# Patient Record
Sex: MALE | Race: WHITE
[De-identification: names, ages, dates, MRNs, and addresses within clinical notes are randomized per-mention and may not be internally consistent; named-entity substitution may affect disease eponyms.]

---

## 2017-09-14 ENCOUNTER — HOSPITAL ENCOUNTER (OUTPATIENT)
Dept: HOSPITAL 33 - SDC | Age: 67
Discharge: HOME | End: 2017-09-14
Attending: FAMILY MEDICINE
Payer: COMMERCIAL

## 2017-09-14 VITALS — SYSTOLIC BLOOD PRESSURE: 102 MMHG | HEART RATE: 67 BPM | DIASTOLIC BLOOD PRESSURE: 58 MMHG

## 2017-09-14 VITALS — OXYGEN SATURATION: 99 %

## 2017-09-14 DIAGNOSIS — Z12.11: ICD-10-CM

## 2017-09-14 DIAGNOSIS — K21.9: Primary | ICD-10-CM

## 2017-09-14 DIAGNOSIS — R63.4: ICD-10-CM

## 2017-09-14 PROCEDURE — 0DJ08ZZ INSPECTION OF UPPER INTESTINAL TRACT, VIA NATURAL OR ARTIFICIAL OPENING ENDOSCOPIC: ICD-10-PCS

## 2017-09-14 PROCEDURE — 00810: CPT

## 2017-09-14 PROCEDURE — 00740: CPT

## 2017-09-14 PROCEDURE — 0DJD8ZZ INSPECTION OF LOWER INTESTINAL TRACT, VIA NATURAL OR ARTIFICIAL OPENING ENDOSCOPIC: ICD-10-PCS

## 2017-09-14 NOTE — OP
SURGERY DATE/TIME:     09/14/2017  0703



PREOPERATIVE DIAGNOSES:   

1) Persistent gastroesophageal reflux disease. 

2) Screening colonoscopy.

3) Unexplained weight loss.



POSTOPERATIVE DIAGNOSES:    

1) Normal EGD.

2) Normal colon. 



PROCEDURES:    

1) EGD. 

2) Colonoscopy. 



SURGEON: Arjun Salgado M.D.



ANESTHESIA:  MAC by Aidan Nagy CRNA. 



ESTIMATED BLOOD LOSS:  None. 



SPECIMENS:  None.          



DESCRIPTION OF PROCEDURE: After informed written consent was obtained, the patient was 
taken to the endoscopy suite. He underwent monitored anesthesia after a bite block was 
inserted. The endoscope was inserted in the posterior oropharynx and under direct 
visualization the esophagus was traversed. The esophageal mucosa was normal in appearance 
free of lesions or defects. The gastric mucosa had no obvious abnormalities upon entering 
the gastric cavity. The pylorus was traversed and the first and second portions of the 
duodenum was within normal limits. Upon withdrawal all mucosal structures appeared normal. 
The scope was removed and the scopes were switched.   

 

Digital rectal exam showed normal sphincter tone and no internal lesions. The scope was 
inserted in the rectum and sequentially the entire colonic mucosa was traversed. The level 
of cecum was reached and verified with direct visualization of ileocecal valve. Upon 
withdrawal careful mucosal inspection revealed no gross abnormalities. Prep was noted to 
be fair. There was some liquid stool throughout the colon. Prior to withdrawal 
retroflexion was performed and showed no internal lesions. The scope was removed and the 
patient was transferred to the recovery room in excellent condition.

## 2022-06-16 ENCOUNTER — HOSPITAL ENCOUNTER (OUTPATIENT)
Dept: HOSPITAL 33 - ED | Age: 72
Setting detail: OBSERVATION
LOS: 2 days | Discharge: HOME | End: 2022-06-18
Attending: FAMILY MEDICINE | Admitting: FAMILY MEDICINE
Payer: MEDICARE

## 2022-06-16 DIAGNOSIS — Z20.828: ICD-10-CM

## 2022-06-16 DIAGNOSIS — Z79.899: ICD-10-CM

## 2022-06-16 DIAGNOSIS — J18.9: Primary | ICD-10-CM

## 2022-06-16 DIAGNOSIS — Z72.0: ICD-10-CM

## 2022-06-16 LAB
ALBUMIN SERPL-MCNC: 4 G/DL (ref 3.5–5)
ALP SERPL-CCNC: 66 U/L (ref 38–126)
ALT SERPL-CCNC: 12 U/L (ref 0–50)
AMPHETAMINES UR QL: NEGATIVE
ANION GAP SERPL CALC-SCNC: 13.3 MEQ/L (ref 5–15)
AST SERPL QL: 22 U/L (ref 17–59)
BARBITURATES UR QL: NEGATIVE
BASOPHILS # BLD AUTO: 0.04 X10^3/UL (ref 0–0.4)
BENZODIAZ UR QL SCN: NEGATIVE
BILIRUB BLD-MCNC: 0.7 MG/DL (ref 0.2–1.3)
BLD SMEAR INTERP: YES
BUN SERPL-MCNC: 21 MG/DL (ref 9–20)
CALCIUM SPEC-MCNC: 9.2 MG/DL (ref 8.4–10.2)
CHLORIDE SERPL-SCNC: 95 MMOL/L (ref 98–107)
CO2 SERPL-SCNC: 28 MMOL/L (ref 22–30)
COCAINE UR QL SCN: NEGATIVE
CREAT SERPL-MCNC: 0.96 MG/DL (ref 0.66–1.25)
EOSINOPHIL # BLD AUTO: 0.08 X10^3/UL (ref 0–0.5)
ETHANOL SERPL-MCNC: < 10 MG/DL (ref 0–10)
FLUAV AG NPH QL IA: NEGATIVE
FLUBV AG NPH QL IA: NEGATIVE
GFR SERPLBLD BASED ON 1.73 SQ M-ARVRAT: > 60 ML/MIN
GLUCOSE SERPL-MCNC: 108 MG/DL (ref 74–106)
GLUCOSE UR-MCNC: NEGATIVE MG/DL
HCT VFR BLD AUTO: 39 % (ref 42–50)
HGB BLD-MCNC: 13.3 G/DL (ref 12.5–18)
LYMPHOCYTES # SPEC AUTO: 1 X10^3/UL (ref 1–4.6)
MCH RBC QN AUTO: 30.2 PG (ref 26–32)
MCHC RBC AUTO-ENTMCNC: 34.1 G/DL (ref 32–36)
METHADONE UR QL: NEGATIVE
MONOCYTES # BLD AUTO: 1.52 X10^3/UL (ref 0–1.3)
OPIATES UR QL: NEGATIVE
PCP UR QL CFM>20 NG/ML: NEGATIVE
PLATELET # BLD AUTO: 200 X10^3/UL (ref 150–450)
POTASSIUM SERPLBLD-SCNC: 3.9 MMOL/L (ref 3.5–5.1)
PROT SERPL-MCNC: 6.6 G/DL (ref 6.3–8.2)
PROT UR STRIP-MCNC: NEGATIVE MG/DL
RBC # BLD AUTO: 4.41 X10^6/UL (ref 4.1–5.6)
RBC # UR AUTO: (no result) ERY/UL (ref 0–5)
RBC #/AREA URNS HPF: (no result) /HPF (ref 0–2)
RSV AG SPEC QL IA: NEGATIVE
SARS-COV-2 AG RESP QL IA.RAPID: NEGATIVE
SODIUM SERPL-SCNC: 133 MMOL/L (ref 137–145)
THC UR QL SCN: NEGATIVE
UA DIPSTICK PNL UR: (no result)
URINE CULTURED INDICATED?: NO
WBC # BLD AUTO: 12.8 X10^3/UL (ref 4–10.5)
WBC #/AREA URNS HPF: (no result) /HPF (ref 0–5)

## 2022-06-16 PROCEDURE — 71250 CT THORAX DX C-: CPT

## 2022-06-16 PROCEDURE — 99285 EMERGENCY DEPT VISIT HI MDM: CPT

## 2022-06-16 PROCEDURE — 94760 N-INVAS EAR/PLS OXIMETRY 1: CPT

## 2022-06-16 PROCEDURE — 96374 THER/PROPH/DIAG INJ IV PUSH: CPT

## 2022-06-16 PROCEDURE — G0378 HOSPITAL OBSERVATION PER HR: HCPCS

## 2022-06-16 PROCEDURE — 83605 ASSAY OF LACTIC ACID: CPT

## 2022-06-16 PROCEDURE — 80053 COMPREHEN METABOLIC PANEL: CPT

## 2022-06-16 PROCEDURE — 70450 CT HEAD/BRAIN W/O DYE: CPT

## 2022-06-16 PROCEDURE — 84443 ASSAY THYROID STIM HORMONE: CPT

## 2022-06-16 PROCEDURE — 36415 COLL VENOUS BLD VENIPUNCTURE: CPT

## 2022-06-16 PROCEDURE — 81015 MICROSCOPIC EXAM OF URINE: CPT

## 2022-06-16 PROCEDURE — 84484 ASSAY OF TROPONIN QUANT: CPT

## 2022-06-16 PROCEDURE — G0480 DRUG TEST DEF 1-7 CLASSES: HCPCS

## 2022-06-16 PROCEDURE — 82607 VITAMIN B-12: CPT

## 2022-06-16 PROCEDURE — 93005 ELECTROCARDIOGRAM TRACING: CPT

## 2022-06-16 PROCEDURE — 36000 PLACE NEEDLE IN VEIN: CPT

## 2022-06-16 PROCEDURE — 80048 BASIC METABOLIC PNL TOTAL CA: CPT

## 2022-06-16 PROCEDURE — 85025 COMPLETE CBC W/AUTO DIFF WBC: CPT

## 2022-06-16 PROCEDURE — 0241U: CPT

## 2022-06-16 PROCEDURE — 80307 DRUG TEST PRSMV CHEM ANLYZR: CPT

## 2022-06-16 PROCEDURE — 87040 BLOOD CULTURE FOR BACTERIA: CPT

## 2022-06-16 RX ADMIN — THERA TABS SCH TAB: TAB at 22:36

## 2022-06-16 RX ADMIN — TIMOLOL MALEATE SCH ML: 5 SOLUTION OPHTHALMIC at 22:36

## 2022-06-16 RX ADMIN — SIMVASTATIN SCH MG: 20 TABLET, FILM COATED ORAL at 22:36

## 2022-06-16 RX ADMIN — ASPIRIN SCH MG: 81 TABLET, COATED ORAL at 22:36

## 2022-06-16 RX ADMIN — CHLORTHALIDONE SCH MG: 25 TABLET ORAL at 23:15

## 2022-06-16 RX ADMIN — OXYBUTYNIN CHLORIDE SCH MG: 5 TABLET, EXTENDED RELEASE ORAL at 22:36

## 2022-06-16 RX ADMIN — AZITHROMYCIN DIHYDRATE SCH MLS/HR: 500 INJECTION, POWDER, LYOPHILIZED, FOR SOLUTION INTRAVENOUS at 22:35

## 2022-06-16 RX ADMIN — MELOXICAM SCH MG: 7.5 TABLET ORAL at 22:36

## 2022-06-16 NOTE — ERPHSYRPT
- History of Present Illness


Source: patient, other (Wife)


Exam Limitations: no limitations


Patient Subjective Stated Complaint: pt here for generally not feeling since 

tuesday, aches all over, falling asleep often at home, sob at times


Triage Nursing Assessment: pt alert, walked in, able to undress, resp easy, face

mask in place, o2 sat 88% ra, o2 at 2 lnc applied


Physician History: 





72 yo wm w lethargy x 1 week. Pt/wife deny focal weakness. Pt has a worsening 

cough/mild coryza. N/V/D/melena/hematochezia/fever/dysuria/hematuria/chest pain 

all denied.Pt w sats high 80's upon arrival which increased w 2L O2 NC.


Timing/Duration: other (1week)


Associated Symptoms: shortness of breath, cough, chills, loss of appetite, 

malaise, weakness, No nausea, No vomiting, No abdominal pain, No heartburn, No 

diaphoresis, No chest pain, No fever, No headaches, No rash, No syncope, No 

seizure


Allergies/Adverse Reactions: 








No Known Drug Allergies Allergy (Verified 06/16/22 20:26)


   





Home Medications: 








Aspirin 81 gm Chew*** [Baby Aspirin 81 mg Chew***] 81 mg PO QHS 10/22/14 

[History]


Meloxicam 7.5 tab PO QHS 09/11/17 [History]


Multivitamin [Multivitamins] 1 tab PO QHS 09/11/17 [History]


Oxybutynin Chloride [Oxybutynin Chloride ER] 10 mg PO QHS 09/11/17 [History]


Chlorthalidone 12.5 mg PO QHS 06/16/22 [History]


Latanoprost/Pf [Latanoprost 0.005% Eye Drop] 1 drop OP DAILY 06/16/22 [History]


Pravastatin Sodium 20 mg PO QHS 06/16/22 [History]


Timolol Maleate/Pf [Timolol Maleate 0.5% Eye Drop] 1 drop OP QHS 06/16/22 

[History]





Hx Tetanus, Diphtheria Vaccination/Date Given: No


Hx Influenza Vaccination/Date Given: No


Hx Pneumococcal Vaccination/Date Given: No


Immunizations Up to Date: Yes





Travel Risk





- International Travel


Have you traveled outside of the country in past 3 weeks: No





- Coronavirus Screening


Are you exhibiting any of the following symptoms?: Yes


Symptoms: Shortness of Breath, Headaches/Body Aches/Fatigue


Close contact with a COVID-19 positive Pt in past 14-21 Days: No





- Vaccine Status


Have you recieved a Covid-19 vaccination: No





- Review of Systems


Constitutional: No Symptoms, Fatigue, Lethargy, Malaise


Eyes: No Symptoms


Ears, Nose, & Throat: No Symptoms


Respiratory: No Symptoms, Cough


Cardiac: No Symptoms


Abdominal/Gastrointestinal: No Symptoms


Genitourinary Symptoms: No Symptoms


Musculoskeletal: No Symptoms


Skin: No Symptoms


Neurological: No Symptoms, Lethargy, No Dizziness, No Focal Weakness, No Gait 

Changes, No Headache, No Irritability, No Paralysis, No Parasthesia, No Seizure,

 No Sensory Changes, No Speech Changes, No Tics, No Tremors, No Vertigo


Psychological: No Symptoms


Endocrine: No Symptoms


Hematologic/Lymphatic: No Symptoms


Immunological/Allergic: No Symptoms





- Past Medical History


Pertinent Past Medical History: Yes


Neurological History: TIA


ENT History: No Pertinent History


Cardiac History: No Pertinent History


Respiratory History: No Pertinent History


Endocrine Medical History: No Pertinent History


Musculoskeletal History: No Pertinent History


GI Medical History: No Pertinent History


 History: Other


Psycho-Social History: No Pertinent History


Male Reproductive Disorders: No Pertinent History


Other Medical History: 3 back surgeries, 2 TIA





- Past Surgical History


Past Surgical History: Yes


Neuro Surgical History: No Pertinent History


Cardiac: No Pertinent History


Respiratory: No Pertinent History


Gastrointestinal: Appendectomy, Hernia Repair


Genitourinary: No Pertinent History


Musculoskeletal: Orthopedic Surgery


Male Surgical History: No Pertinent History


Other Surgical History: Back surgery x3





- Social History


Smoking Status: Current every day smoker


How long have you smoked: 50


Exposure to second hand smoke: Yes


Drug Use: none


Patient Lives Alone: No


Significant Family History: no pertinent family hx





- Nursing Vital Signs


Nursing Vital Signs: 


                               Initial Vital Signs











Temperature  99.2 F   06/16/22 17:19


 


Pulse Rate  84   06/16/22 17:19


 


Respiratory Rate  28 H  06/16/22 17:19


 


Blood Pressure  139/70   06/16/22 17:19


 


O2 Sat by Pulse Oximetry  87 L  06/16/22 17:19








                                   Pain Scale











Pain Intensity                 0











Borderline hypertension





- Physical Exam


General Appearance: no apparent distress, lethargy


Eye Exam: PERRL/EOMI, eyes nml inspection


Ears, Nose, Throat Exam: normal ENT inspection, TMs normal, pharynx normal, 

moist mucous membranes


Neck Exam: normal inspection, non-tender, supple, full range of motion, No 

meningismus, No mass, No Brudzinski, No Kernig's


Respiratory Exam: airway intact, diminished breath sounds (Decreased BS B)


Cardiovascular Exam: regular rate/rhythm, normal heart sounds, normal peripheral

 pulses, capillary refill <2 sec, No murmur


Gastrointestinal/Abdomen Exam: soft, normal bowel sounds, No tenderness


Back Exam: normal inspection, normal range of motion, No CVA tenderness, No 

vertebral tenderness


Extremity Exam: normal inspection, normal range of motion


Neurologic Exam: alert, oriented x 3, cooperative, CNs II-XII nml as tested, 

normal mood/affect, nml cerebellar function, nml station & gait, sensation nml


Skin Exam: normal color, warm, dry


Lymphatic Exam: No adenopathy


**SpO2 Interpretation**: hypoxic


SpO2: 87


O2 Delivery: Room Air





- Course


Nursing assessment & vital signs reviewed: Yes


EKG Interpreted by Me: RATE (NSR/Rate 82/RBBB/Nonspecific St-Twave changes)





- CT Exams


  ** Head


CT Interpretation: Discussed w/radiologist (NAD)





  ** Chest


CT Interpretation: Discussed w/radiologist (RLL/Mild LLL pneumonia)


Ordered Tests: 


                               Active Orders 24 hr











 Category Date Time Status


 


 EKG-ER Only STAT Care  06/16/22 17:38 Completed


 


 IV Insertion STAT Care  06/16/22 17:38 Completed


 


 Heart-Healthy  Diet Diet  06/17/22 Breakfast Active


 


 CHEST WITHOUT CONTRAST [CT] Stat Exams  06/16/22 17:39 Taken


 


 HEAD WITHOUT CONTRAST [CT] Stat Exams  06/16/22 17:39 Taken


 


 BLOOD CULTURE Stat Lab  06/16/22 19:44 Stop Req


 


 CBC W DIFF AM.LAB Lab  06/17/22 04:00 Ordered


 


 CBC W DIFF Stat Lab  06/16/22 17:55 Completed


 


 CMP AM.LAB Lab  06/17/22 04:00 Ordered


 


 CMP Stat Lab  06/16/22 17:55 Completed


 


 ETHYL ALCOHOL Stat Lab  06/16/22 17:55 Completed


 


 Lactic Acid AM.LAB Lab  06/17/22 04:00 Ordered


 


 Lactic Acid Stat Lab  06/16/22 17:55 Completed


 


 TROPONIN Q3H Lab  06/16/22 17:55 Completed


 


 TROPONIN Q3H Lab  06/16/22 19:44 Completed


 


 TROPONIN Q3H Lab  06/17/22 02:45 Ordered


 


 TROPONIN Q3H Lab  06/17/22 05:45 Ordered


 


 UA W/RFX CULTURE Stat Lab  06/16/22 20:55 Completed


 


 Urine Triage Profile Stat Lab  06/16/22 20:55 Completed


 


 Transfer Order Routine Transfer  06/16/22 Completed








Medication Summary











Generic Name Dose Route Start Last Admin





  Trade Name Freq  PRN Reason Stop Dose Admin


 


Albuterol/Ipratropium  3 ml  06/16/22 19:33 





  Ipratropium/Albuterol Sulfate 3 Ml Ampul.Neb  IH  07/16/22 19:32 





  Q4HPRN PRN  





  SHORTNESS OF BREATH/WHEEZING  


 


Aspirin  81 mg  06/16/22 22:30  06/16/22 22:36





  Aspirin 81 Mg Tablet.Ec  PO  07/16/22 22:29  81 mg





  HS HIRO   Administration


 


Chlorthalidone  12.5 mg  06/16/22 22:30  06/16/22 23:15





  Chlorthalidone 25 Mg Tablet  PO  07/16/22 22:29  12.5 mg





  HS HIRO   Administration


 


Enoxaparin Sodium  40 mg  06/17/22 10:00 





  Enoxaparin Sodium*** 40 Mg/0.4 Ml Syringe  SQ  07/17/22 09:59 





  DAILY HIRO  


 


Sodium Chloride  1,000 mls @ 100 mls/hr  06/16/22 19:45  06/16/22 19:45





  Sodium Chloride 0.9% 1000 Ml  IV  07/16/22 19:44  100 mls/hr





  .Q10H HIRO   Administration


 


Ceftriaxone Sodium/Dextrose  1 g in 50 mls @ 100 mls/hr  06/17/22 22:00 





  Rocephin 1 Gm-D5w 50 Ml Bag**  IV  06/20/22 21:59 





  Q24H22 HIRO  


 


Azithromycin  500 mg in 250 mls @ 250 mls/hr  06/16/22 22:00  06/16/22 22:35





  Zithromax 500 Mg/ 250 Ml Nacl Premix  IV  07/16/22 21:59  250 mls/hr





  Q24H22 HIRO   Administration


 


Meloxicam  7.5 mg  06/16/22 22:30  06/16/22 22:36





  Meloxicam 7.5 Mg Tablet  PO  07/16/22 22:29  7.5 mg





  HS HIRO   Administration


 


Multivitamins Therapeutic  1 tab  06/16/22 22:30  06/16/22 22:36





  Multivitamins,Therapeutic 1 Tab Tab  PO  07/16/22 22:29  1 tab





  HS HIRO   Administration


 


Ondansetron HCl  4 mg  06/16/22 19:33 





  Ondansetron Hcl 4 Mg/2 Ml Vial  IV  07/16/22 19:32 





  Q6H PRN PRN  





  NAUSEA/VOMITING  


 


Oxybutynin Chloride  10 mg  06/16/22 22:30  06/16/22 22:36





  Oxybutynin Chloride Xl 5 Mg Tab  PO  07/16/22 22:29  10 mg





  HS HIRO   Administration


 


Pantoprazole Sodium  40 mg  06/17/22 10:00 





  Pantoprazole 40 Mg Vial  IV  07/17/22 09:59 





  Q24H10 HIRO  


 


Simvastatin  20 mg  06/16/22 22:30  06/16/22 22:36





  Simvastatin 20 Mg Tablet  PO  07/16/22 22:29  20 mg





  HS HIRO   Administration


 


Timolol Maleate  0 ml  06/16/22 22:30  06/16/22 22:36





  Timolol Maleate 0.5% 5 Ml  Eye Drops  OP  07/16/22 22:29  1 ml





  HS HIRO   Administration














Discontinued Medications














Generic Name Dose Route Start Last Admin





  Trade Name Freq  PRN Reason Stop Dose Admin


 


Aspirin  81 mg  06/16/22 22:30 





  Aspirin 81 Mg Tablet.Ec  PO  06/16/22 22:31 





  ONCE ONE  


 


Ceftriaxone Sodium/Dextrose  1 g in 50 mls @ 100 mls/hr  06/16/22 19:22  

06/16/22 19:56





  Rocephin 1 Gm-D5w 50 Ml Bag**  IV  06/16/22 19:51  Infused





  STAT STA   Infusion


 


Ceftriaxone Sodium/Dextrose  Confirm  06/16/22 19:24 





  Rocephin 1 Gm-D5w 50 Ml Bag**  Administered  06/16/22 19:25 





  Dose  





  1 g in 50 mls @ ud  





  IV  





  .STK-MED ONE  


 


Oxybutynin Chloride  10 mg  06/16/22 22:30 





  Oxybutynin Chloride Xl 5 Mg Tab  PO  06/16/22 22:31 





  ONCE ONE  











Lab/Rad Data: 


                           Laboratory Result Diagrams





                                 06/16/22 17:55 





                                 06/16/22 17:55 





                               Laboratory Results











  06/16/22 06/16/22 06/16/22 Range/Units





  19:44 17:55 17:55 


 


WBC     (4.0-10.5)  x10^3/uL


 


RBC     (4.1-5.6)  x10^6/uL


 


Hgb     (12.5-18.0)  g/dL


 


Hct     (42-50)  %


 


MCV     ()  fL


 


MCH     (26-32)  pg


 


MCHC     (32-36)  g/dL


 


RDW     (11.5-14.0)  %


 


Plt Count     (150-450)  x10^3/uL


 


MPV     (7.5-11.0)  fL


 


Gran %     (36.0-66.0)  %


 


Immature Gran % (Auto)     (0.00-0.4)  %


 


Nucleat RBC Rel Count     (0.00-0.1)  %


 


Eos # (Auto)     (0-0.5)  x10^3/uL


 


Immature Gran # (Auto)     (0.00-0.03)  x10^3u/L


 


Absolute Lymphs (auto)     (1.0-4.6)  x10^3/uL


 


Absolute Monos (auto)     (0.0-1.3)  x10^3/uL


 


Absolute Nucleated RBC     (0.00-0.01)  x10^3u/L


 


Lymphocytes %     (24.0-44.0)  %


 


Monocytes %     (0.0-12.0)  %


 


Eosinophils %     (0.00-5.0)  %


 


Basophils %     (0.0-0.4)  %


 


Absolute Granulocytes     (1.4-6.9)  x10^3/uL


 


Basophils #     (0-0.4)  x10^3/uL


 


Sodium     (137-145)  mmol/L


 


Potassium     (3.5-5.1)  mmol/L


 


Chloride     ()  mmol/L


 


Carbon Dioxide     (22-30)  mmol/L


 


Anion Gap     (5-15)  MEQ/L


 


BUN     (9-20)  mg/dL


 


Creatinine     (0.66-1.25)  mg/dL


 


Estimated GFR     ML/MIN


 


Glucose     ()  mg/dL


 


Lactic Acid     (0.4-2.0)  


 


Calcium     (8.4-10.2)  mg/dL


 


Total Bilirubin     (0.2-1.3)  mg/dL


 


AST     (17-59)  U/L


 


ALT     (0-50)  U/L


 


Alkaline Phosphatase     ()  U/L


 


Troponin I  < 0.012   < 0.012  (0.000-0.034)  ng/mL


 


Serum Total Protein     (6.3-8.2)  g/dL


 


Albumin     (3.5-5.0)  g/dL


 


Ethyl Alcohol     (0-10)  mg/dL


 


Influenza Type A Ag   NEGATIVE   (NEGATIVE)  


 


Influenza Type B Ag   NEGATIVE   (NEGATIVE)  


 


RSV (PCR)   NEGATIVE   (Negative)  


 


SARS-CoV-2 (PCR)   NEGATIVE   (NEGATIVE)  


 


Slides for Path Review     














  06/16/22 06/16/22 06/16/22 Range/Units





  17:55 17:55 17:55 


 


WBC    12.8 H  (4.0-10.5)  x10^3/uL


 


RBC    4.41  (4.1-5.6)  x10^6/uL


 


Hgb    13.3  (12.5-18.0)  g/dL


 


Hct    39.0 L  (42-50)  %


 


MCV    88.4  ()  fL


 


MCH    30.2  (26-32)  pg


 


MCHC    34.1  (32-36)  g/dL


 


RDW    13.2  (11.5-14.0)  %


 


Plt Count    200  (150-450)  x10^3/uL


 


MPV    8.5  (7.5-11.0)  fL


 


Gran %    78.9 H  (36.0-66.0)  %


 


Immature Gran % (Auto)    0.5 H  (0.00-0.4)  %


 


Nucleat RBC Rel Count    0.0  (0.00-0.1)  %


 


Eos # (Auto)    0.08  (0-0.5)  x10^3/uL


 


Immature Gran # (Auto)    0.07 H  (0.00-0.03)  x10^3u/L


 


Absolute Lymphs (auto)    1.00  (1.0-4.6)  x10^3/uL


 


Absolute Monos (auto)    1.52 H  (0.0-1.3)  x10^3/uL


 


Absolute Nucleated RBC    0.00  (0.00-0.01)  x10^3u/L


 


Lymphocytes %    7.8 L  (24.0-44.0)  %


 


Monocytes %    11.9  (0.0-12.0)  %


 


Eosinophils %    0.6  (0.00-5.0)  %


 


Basophils %    0.3  (0.0-0.4)  %


 


Absolute Granulocytes    10.08 H  (1.4-6.9)  x10^3/uL


 


Basophils #    0.04  (0-0.4)  x10^3/uL


 


Sodium  133 L    (137-145)  mmol/L


 


Potassium  3.9    (3.5-5.1)  mmol/L


 


Chloride  95 L    ()  mmol/L


 


Carbon Dioxide  28    (22-30)  mmol/L


 


Anion Gap  13.3    (5-15)  MEQ/L


 


BUN  21 H    (9-20)  mg/dL


 


Creatinine  0.96    (0.66-1.25)  mg/dL


 


Estimated GFR  > 60.0    ML/MIN


 


Glucose  108 H    ()  mg/dL


 


Lactic Acid   0.9   (0.4-2.0)  


 


Calcium  9.2    (8.4-10.2)  mg/dL


 


Total Bilirubin  0.70    (0.2-1.3)  mg/dL


 


AST  22    (17-59)  U/L


 


ALT  12    (0-50)  U/L


 


Alkaline Phosphatase  66    ()  U/L


 


Troponin I     (0.000-0.034)  ng/mL


 


Serum Total Protein  6.6    (6.3-8.2)  g/dL


 


Albumin  4.0    (3.5-5.0)  g/dL


 


Ethyl Alcohol  < 10    (0-10)  mg/dL


 


Influenza Type A Ag     (NEGATIVE)  


 


Influenza Type B Ag     (NEGATIVE)  


 


RSV (PCR)     (Negative)  


 


SARS-CoV-2 (PCR)     (NEGATIVE)  


 


Slides for Path Review    YES  














- Progress


Progress: improved


Progress Note: 





06/16/22 19:32


Blood cultures x2


Rocephin 1gm IV


Discussed with Dr.: Malena


Will see patient in: hospital (observation)


Counseled pt/family regarding: lab results, diagnosis, need for follow-up, rad 

results





- Departure


Departure Disposition: Observation


Clinical Impression: 


 Pneumonia





Condition: Stable


Critical Care Time: No

## 2022-06-17 LAB
ALBUMIN SERPL-MCNC: 3.3 G/DL (ref 3.5–5)
ALP SERPL-CCNC: 57 U/L (ref 38–126)
ALT SERPL-CCNC: 12 U/L (ref 0–50)
ANION GAP SERPL CALC-SCNC: 11.5 MEQ/L (ref 5–15)
AST SERPL QL: 21 U/L (ref 17–59)
BASOPHILS # BLD AUTO: 0.03 X10^3/UL (ref 0–0.4)
BILIRUB BLD-MCNC: 0.5 MG/DL (ref 0.2–1.3)
BUN SERPL-MCNC: 21 MG/DL (ref 9–20)
CALCIUM SPEC-MCNC: 8.7 MG/DL (ref 8.4–10.2)
CHLORIDE SERPL-SCNC: 100 MMOL/L (ref 98–107)
CO2 SERPL-SCNC: 28 MMOL/L (ref 22–30)
CREAT SERPL-MCNC: 1 MG/DL (ref 0.66–1.25)
EOSINOPHIL # BLD AUTO: 0.12 X10^3/UL (ref 0–0.5)
GFR SERPLBLD BASED ON 1.73 SQ M-ARVRAT: > 60 ML/MIN
GLUCOSE SERPL-MCNC: 116 MG/DL (ref 74–106)
HCT VFR BLD AUTO: 37 % (ref 42–50)
HGB BLD-MCNC: 12.2 G/DL (ref 12.5–18)
LYMPHOCYTES # SPEC AUTO: 1.62 X10^3/UL (ref 1–4.6)
MCH RBC QN AUTO: 29.1 PG (ref 26–32)
MCHC RBC AUTO-ENTMCNC: 33 G/DL (ref 32–36)
MONOCYTES # BLD AUTO: 1.49 X10^3/UL (ref 0–1.3)
PLATELET # BLD AUTO: 210 X10^3/UL (ref 150–450)
POTASSIUM SERPLBLD-SCNC: 3.5 MMOL/L (ref 3.5–5.1)
PROT SERPL-MCNC: 6.1 G/DL (ref 6.3–8.2)
RBC # BLD AUTO: 4.19 X10^6/UL (ref 4.1–5.6)
SODIUM SERPL-SCNC: 135 MMOL/L (ref 137–145)
WBC # BLD AUTO: 11.4 X10^3/UL (ref 4–10.5)

## 2022-06-17 RX ADMIN — SIMVASTATIN SCH MG: 20 TABLET, FILM COATED ORAL at 21:23

## 2022-06-17 RX ADMIN — CHLORTHALIDONE SCH MG: 25 TABLET ORAL at 21:29

## 2022-06-17 RX ADMIN — LATANOPROST SCH ML: 50 SOLUTION OPHTHALMIC at 09:12

## 2022-06-17 RX ADMIN — OXYBUTYNIN CHLORIDE SCH MG: 5 TABLET, EXTENDED RELEASE ORAL at 21:22

## 2022-06-17 RX ADMIN — PANTOPRAZOLE SODIUM SCH MG: 40 INJECTION, POWDER, FOR SOLUTION INTRAVENOUS at 09:09

## 2022-06-17 RX ADMIN — TIMOLOL MALEATE SCH ML: 5 SOLUTION OPHTHALMIC at 21:22

## 2022-06-17 RX ADMIN — THERA TABS SCH TAB: TAB at 21:23

## 2022-06-17 RX ADMIN — ENOXAPARIN SODIUM SCH MG: 100 INJECTION SUBCUTANEOUS at 09:09

## 2022-06-17 RX ADMIN — AZITHROMYCIN DIHYDRATE SCH MLS/HR: 500 INJECTION, POWDER, LYOPHILIZED, FOR SOLUTION INTRAVENOUS at 21:31

## 2022-06-17 RX ADMIN — ASPIRIN SCH MG: 81 TABLET, COATED ORAL at 21:23

## 2022-06-17 RX ADMIN — MELOXICAM SCH MG: 7.5 TABLET ORAL at 21:23

## 2022-06-17 NOTE — PCM.HP
History of Present Illness





- Chief Complaint


Chief Complaint: pneumonia


History of Present Illness: 


Mr.FISH MENDOZA is a 71 year old male who reported to the ER last night with a cough 

and increasing somnolence for the last 1 week, he is alert and eating breakfast 

and states he is feeling better this morning, his cough was productive prior to 

admission. no other complaints.








- Review of Systems


Constitutional: Fatigue, No Fever, No Chills


Respiratory: Cough, Short Of Breath


Cardiac: No Chest Pain, No Edema, No Syncope


Abdominal/Gastrointestinal: No Abdominal Pain, No Nausea, No Vomiting, No 

Diarrhea


Genitourinary Symptoms: No Dysuria


Skin: No Rash





Medications & Allergies


Home Medications: 


                              Home Medication List





Aspirin 81 gm Chew*** [Baby Aspirin 81 mg Chew***] 81 mg PO QHS 10/22/14 

[History Confirmed 06/16/22]


Meloxicam 7.5 tab PO QHS 09/11/17 [History Confirmed 06/16/22]


Multivitamin [Multivitamins] 1 tab PO QHS 09/11/17 [History Confirmed 06/16/22]


Oxybutynin Chloride [Oxybutynin Chloride ER] 10 mg PO QHS 09/11/17 [History 

Confirmed 06/16/22]


Chlorthalidone 12.5 mg PO QHS 06/16/22 [History Confirmed 06/16/22]


Latanoprost/Pf [Latanoprost 0.005% Eye Drop] 1 drop OP DAILY 06/16/22 [History 

Confirmed 06/16/22]


Pravastatin Sodium 20 mg PO QHS 06/16/22 [History Confirmed 06/16/22]


Timolol Maleate/Pf [Timolol Maleate 0.5% Eye Drop] 1 drop OP QHS 06/16/22 

[History Confirmed 06/16/22]








Allergies/Adverse Reactions: 


                                    Allergies











Allergy/AdvReac Type Severity Reaction Status Date / Time


 


No Known Drug Allergies Allergy   Verified 06/16/22 20:26














- Past Medical History


Past Medical History: Yes


Neurological History: TIA


ENT History: No Pertinent History


Cardiac History: No Pertinent History


Respiratory History: No Pertinent History


Endocrine Medical History: No Pertinent History


Musculoskelatal History: No Pertinent History


GI Medical History: No Pertinent History


 History: Other


Pyscho-Social History: No Pertinent History


Male Reproductive Disorders: No Pertinent History


Comment: 3 back surgeries, 2 TIA





- Past Surgical History


Past Surgical History: Yes


Neuro Surgical History: No Pertinent History


Cardiac History: No Pertinent History


Respiratory Surgery: No Pertinent History


GI Surgical History: Appendectomy, Hernia Repair


Genitourinary Surgical Hx: No Pertinent History


Musculskeletal Surgical Hx: Orthopedic Surgery


Male Surgical History: No Pertinent History


Other Surgical History: Back surgery x3





- Social History


Smoking Status: Current every day smoker


How long have you smoked: 50


Exposure to second hand smoke: Yes


Alcohol: None


Drug Use: none


Significant Family History: no pertinent family hx





- Physical Exam


Vital Signs: 


                               Vital Signs - 24 hr











  Temp Pulse Resp BP Pulse Ox


 


 06/17/22 08:00  97.7 F  64  15  115/55  95


 


 06/17/22 06:47   68  16   92 L


 


 06/17/22 04:00  100 F  72  20  107/56  95


 


 06/16/22 23:51      87 L


 


 06/16/22 23:22  98.0 F  78  18  98/53  96


 


 06/16/22 22:46      94 L


 


 06/16/22 22:39   78  16   94 L


 


 06/16/22 20:39  98.9 F  82  20  107/56  94 L


 


 06/16/22 19:59   79  32 H  121/72  93 L


 


 06/16/22 19:00   79   124/67  92 L


 


 06/16/22 18:20   80   125/70  92 L


 


 06/16/22 17:19  99.2 F  84  28 H  139/70  87 L











General Appearance: no apparent distress


Neurologic Exam: alert, oriented x 3, cooperative, normal mood/affect, nml 

cerebellar function, nml station & gait, sensation nml, No motor deficits


Respiratory Exam: rhonchi


Cardiovascular Exam: regular rate/rhythm, normal heart sounds, normal peripheral

pulses


Gastrointestinal/Abdomen Exam: soft, normal bowel sounds, No tenderness, No mass


Extremity Exam: normal inspection, normal range of motion, pelvis stable


Skin Exam: normal color, warm, dry, No rash





Results





- Labs


Lab/Micro Results: 


                            Lab Results-Last 24 Hours











  06/16/22 06/16/22 06/16/22 Range/Units





  17:55 17:55 17:55 


 


WBC  12.8 H    (4.0-10.5)  x10^3/uL


 


RBC  4.41    (4.1-5.6)  x10^6/uL


 


Hgb  13.3    (12.5-18.0)  g/dL


 


Hct  39.0 L    (42-50)  %


 


MCV  88.4    ()  fL


 


MCH  30.2    (26-32)  pg


 


MCHC  34.1    (32-36)  g/dL


 


RDW  13.2    (11.5-14.0)  %


 


Plt Count  200    (150-450)  x10^3/uL


 


MPV  8.5    (7.5-11.0)  fL


 


Gran %  78.9 H    (36.0-66.0)  %


 


Immature Gran % (Auto)  0.5 H    (0.00-0.4)  %


 


Nucleat RBC Rel Count  0.0    (0.00-0.1)  %


 


Eos # (Auto)  0.08    (0-0.5)  x10^3/uL


 


Immature Gran # (Auto)  0.07 H    (0.00-0.03)  x10^3u/L


 


Absolute Lymphs (auto)  1.00    (1.0-4.6)  x10^3/uL


 


Absolute Monos (auto)  1.52 H    (0.0-1.3)  x10^3/uL


 


Absolute Nucleated RBC  0.00    (0.00-0.01)  x10^3u/L


 


Lymphocytes %  7.8 L    (24.0-44.0)  %


 


Monocytes %  11.9    (0.0-12.0)  %


 


Eosinophils %  0.6    (0.00-5.0)  %


 


Basophils %  0.3    (0.0-0.4)  %


 


Absolute Granulocytes  10.08 H    (1.4-6.9)  x10^3/uL


 


Basophils #  0.04    (0-0.4)  x10^3/uL


 


Sodium    133 L  (137-145)  mmol/L


 


Potassium    3.9  (3.5-5.1)  mmol/L


 


Chloride    95 L  ()  mmol/L


 


Carbon Dioxide    28  (22-30)  mmol/L


 


Anion Gap    13.3  (5-15)  MEQ/L


 


BUN    21 H  (9-20)  mg/dL


 


Creatinine    0.96  (0.66-1.25)  mg/dL


 


Estimated GFR    > 60.0  ML/MIN


 


Glucose    108 H  ()  mg/dL


 


Lactic Acid   0.9   (0.4-2.0)  


 


Calcium    9.2  (8.4-10.2)  mg/dL


 


Total Bilirubin    0.70  (0.2-1.3)  mg/dL


 


AST    22  (17-59)  U/L


 


ALT    12  (0-50)  U/L


 


Alkaline Phosphatase    66  ()  U/L


 


Troponin I     (0.000-0.034)  ng/mL


 


Serum Total Protein    6.6  (6.3-8.2)  g/dL


 


Albumin    4.0  (3.5-5.0)  g/dL


 


Urinalys Dipstick Clnc     


 


Urine Color     (YELLOW)  


 


Urine Appearance     (CLEAR)  


 


Urine pH     (5-6)  


 


Ur Specific Gravity     (1.005-1.025)  


 


POC Urine Protein Conf     (Negative)  


 


Urine Ketones     (NEGATIVE)  


 


Urine Nitrite     (NEGATIVE)  


 


Urine Bilirubin     (NEGATIVE)  


 


Urine Urobilinogen     (0-1)  mg/dL


 


Urine Leukocytes     (NEGATIVE)  


 


Urine WBC (Auto)     (0-5)  /HPF


 


Urine RBC (Auto)     (0-2)  /HPF


 


U Epithel Cells (Auto)     (FEW)  /HPF


 


Urine Bacteria (Auto)     (NEGATIVE)  /HPF


 


Urine RBC     (0-5)  Dereje/ul


 


Ur Culture Indicated?     


 


Urine Glucose     (NEGATIVE)  mg/dL


 


Urine Opiates Level     (NEGATIVE)  


 


Ur Methadone     (NEGATIVE)  


 


Urine Barbiturates     (NEGATIVE)  


 


Ur Phencyclidine (PCP)     (NEGATIVE)  


 


Urine Amphetamine     (NEGATIVE)  


 


U Benzodiazepine Level     (NEGATIVE)  


 


Urine Cocaine     (NEGATIVE)  


 


Urine Marijuana (THC)     (NEGATIVE)  


 


Ethyl Alcohol    < 10  (0-10)  mg/dL


 


Influenza Type A Ag     (NEGATIVE)  


 


Influenza Type B Ag     (NEGATIVE)  


 


RSV (PCR)     (Negative)  


 


SARS-CoV-2 (PCR)     (NEGATIVE)  


 


Slides for Path Review  YES    














  06/16/22 06/16/22 06/16/22 Range/Units





  17:55 17:55 19:44 


 


WBC     (4.0-10.5)  x10^3/uL


 


RBC     (4.1-5.6)  x10^6/uL


 


Hgb     (12.5-18.0)  g/dL


 


Hct     (42-50)  %


 


MCV     ()  fL


 


MCH     (26-32)  pg


 


MCHC     (32-36)  g/dL


 


RDW     (11.5-14.0)  %


 


Plt Count     (150-450)  x10^3/uL


 


MPV     (7.5-11.0)  fL


 


Gran %     (36.0-66.0)  %


 


Immature Gran % (Auto)     (0.00-0.4)  %


 


Nucleat RBC Rel Count     (0.00-0.1)  %


 


Eos # (Auto)     (0-0.5)  x10^3/uL


 


Immature Gran # (Auto)     (0.00-0.03)  x10^3u/L


 


Absolute Lymphs (auto)     (1.0-4.6)  x10^3/uL


 


Absolute Monos (auto)     (0.0-1.3)  x10^3/uL


 


Absolute Nucleated RBC     (0.00-0.01)  x10^3u/L


 


Lymphocytes %     (24.0-44.0)  %


 


Monocytes %     (0.0-12.0)  %


 


Eosinophils %     (0.00-5.0)  %


 


Basophils %     (0.0-0.4)  %


 


Absolute Granulocytes     (1.4-6.9)  x10^3/uL


 


Basophils #     (0-0.4)  x10^3/uL


 


Sodium     (137-145)  mmol/L


 


Potassium     (3.5-5.1)  mmol/L


 


Chloride     ()  mmol/L


 


Carbon Dioxide     (22-30)  mmol/L


 


Anion Gap     (5-15)  MEQ/L


 


BUN     (9-20)  mg/dL


 


Creatinine     (0.66-1.25)  mg/dL


 


Estimated GFR     ML/MIN


 


Glucose     ()  mg/dL


 


Lactic Acid     (0.4-2.0)  


 


Calcium     (8.4-10.2)  mg/dL


 


Total Bilirubin     (0.2-1.3)  mg/dL


 


AST     (17-59)  U/L


 


ALT     (0-50)  U/L


 


Alkaline Phosphatase     ()  U/L


 


Troponin I  < 0.012   < 0.012  (0.000-0.034)  ng/mL


 


Serum Total Protein     (6.3-8.2)  g/dL


 


Albumin     (3.5-5.0)  g/dL


 


Urinalys Dipstick Clnc     


 


Urine Color     (YELLOW)  


 


Urine Appearance     (CLEAR)  


 


Urine pH     (5-6)  


 


Ur Specific Gravity     (1.005-1.025)  


 


POC Urine Protein Conf     (Negative)  


 


Urine Ketones     (NEGATIVE)  


 


Urine Nitrite     (NEGATIVE)  


 


Urine Bilirubin     (NEGATIVE)  


 


Urine Urobilinogen     (0-1)  mg/dL


 


Urine Leukocytes     (NEGATIVE)  


 


Urine WBC (Auto)     (0-5)  /HPF


 


Urine RBC (Auto)     (0-2)  /HPF


 


U Epithel Cells (Auto)     (FEW)  /HPF


 


Urine Bacteria (Auto)     (NEGATIVE)  /HPF


 


Urine RBC     (0-5)  Dereje/ul


 


Ur Culture Indicated?     


 


Urine Glucose     (NEGATIVE)  mg/dL


 


Urine Opiates Level     (NEGATIVE)  


 


Ur Methadone     (NEGATIVE)  


 


Urine Barbiturates     (NEGATIVE)  


 


Ur Phencyclidine (PCP)     (NEGATIVE)  


 


Urine Amphetamine     (NEGATIVE)  


 


U Benzodiazepine Level     (NEGATIVE)  


 


Urine Cocaine     (NEGATIVE)  


 


Urine Marijuana (THC)     (NEGATIVE)  


 


Ethyl Alcohol     (0-10)  mg/dL


 


Influenza Type A Ag   NEGATIVE   (NEGATIVE)  


 


Influenza Type B Ag   NEGATIVE   (NEGATIVE)  


 


RSV (PCR)   NEGATIVE   (Negative)  


 


SARS-CoV-2 (PCR)   NEGATIVE   (NEGATIVE)  


 


Slides for Path Review     














  06/16/22 06/16/22 06/17/22 Range/Units





  20:55 20:55 04:20 


 


WBC    11.4 H  (4.0-10.5)  x10^3/uL


 


RBC    4.19  (4.1-5.6)  x10^6/uL


 


Hgb    12.2 L  (12.5-18.0)  g/dL


 


Hct    37.0 L  (42-50)  %


 


MCV    88.3  ()  fL


 


MCH    29.1  (26-32)  pg


 


MCHC    33.0  (32-36)  g/dL


 


RDW    13.2  (11.5-14.0)  %


 


Plt Count    210  (150-450)  x10^3/uL


 


MPV    8.6  (7.5-11.0)  fL


 


Gran %    70.5 H  (36.0-66.0)  %


 


Immature Gran % (Auto)    0.8 H  (0.00-0.4)  %


 


Nucleat RBC Rel Count    0.0  (0.00-0.1)  %


 


Eos # (Auto)    0.12  (0-0.5)  x10^3/uL


 


Immature Gran # (Auto)    0.09 H  (0.00-0.03)  x10^3u/L


 


Absolute Lymphs (auto)    1.62  (1.0-4.6)  x10^3/uL


 


Absolute Monos (auto)    1.49 H  (0.0-1.3)  x10^3/uL


 


Absolute Nucleated RBC    0.00  (0.00-0.01)  x10^3u/L


 


Lymphocytes %    14.2 L  (24.0-44.0)  %


 


Monocytes %    13.1 H  (0.0-12.0)  %


 


Eosinophils %    1.1  (0.00-5.0)  %


 


Basophils %    0.3  (0.0-0.4)  %


 


Absolute Granulocytes    8.02 H  (1.4-6.9)  x10^3/uL


 


Basophils #    0.03  (0-0.4)  x10^3/uL


 


Sodium     (137-145)  mmol/L


 


Potassium     (3.5-5.1)  mmol/L


 


Chloride     ()  mmol/L


 


Carbon Dioxide     (22-30)  mmol/L


 


Anion Gap     (5-15)  MEQ/L


 


BUN     (9-20)  mg/dL


 


Creatinine     (0.66-1.25)  mg/dL


 


Estimated GFR     ML/MIN


 


Glucose     ()  mg/dL


 


Lactic Acid     (0.4-2.0)  


 


Calcium     (8.4-10.2)  mg/dL


 


Total Bilirubin     (0.2-1.3)  mg/dL


 


AST     (17-59)  U/L


 


ALT     (0-50)  U/L


 


Alkaline Phosphatase     ()  U/L


 


Troponin I     (0.000-0.034)  ng/mL


 


Serum Total Protein     (6.3-8.2)  g/dL


 


Albumin     (3.5-5.0)  g/dL


 


Urinalys Dipstick Clnc  MAIN LAB    


 


Urine Color  YELLOW    (YELLOW)  


 


Urine Appearance  CLEAR    (CLEAR)  


 


Urine pH  6.0    (5-6)  


 


Ur Specific Gravity  1.020    (1.005-1.025)  


 


POC Urine Protein Conf  NEGATIVE    (Negative)  


 


Urine Ketones  SMALL-15    (NEGATIVE)  


 


Urine Nitrite  NEGATIVE    (NEGATIVE)  


 


Urine Bilirubin  NEGATIVE    (NEGATIVE)  


 


Urine Urobilinogen  1    (0-1)  mg/dL


 


Urine Leukocytes  NEGATIVE    (NEGATIVE)  


 


Urine WBC (Auto)  NONE    (0-5)  /HPF


 


Urine RBC (Auto)  3-5    (0-2)  /HPF


 


U Epithel Cells (Auto)  NONE    (FEW)  /HPF


 


Urine Bacteria (Auto)  NONE    (NEGATIVE)  /HPF


 


Urine RBC  TRACE-INTACT    (0-5)  Dereje/ul


 


Ur Culture Indicated?  NO    


 


Urine Glucose  NEGATIVE    (NEGATIVE)  mg/dL


 


Urine Opiates Level   NEGATIVE   (NEGATIVE)  


 


Ur Methadone   NEGATIVE   (NEGATIVE)  


 


Urine Barbiturates   NEGATIVE   (NEGATIVE)  


 


Ur Phencyclidine (PCP)   NEGATIVE   (NEGATIVE)  


 


Urine Amphetamine   NEGATIVE   (NEGATIVE)  


 


U Benzodiazepine Level   NEGATIVE   (NEGATIVE)  


 


Urine Cocaine   NEGATIVE   (NEGATIVE)  


 


Urine Marijuana (THC)   NEGATIVE   (NEGATIVE)  


 


Ethyl Alcohol     (0-10)  mg/dL


 


Influenza Type A Ag     (NEGATIVE)  


 


Influenza Type B Ag     (NEGATIVE)  


 


RSV (PCR)     (Negative)  


 


SARS-CoV-2 (PCR)     (NEGATIVE)  


 


Slides for Path Review     














  06/17/22 06/17/22 Range/Units





  04:20 04:30 


 


WBC    (4.0-10.5)  x10^3/uL


 


RBC    (4.1-5.6)  x10^6/uL


 


Hgb    (12.5-18.0)  g/dL


 


Hct    (42-50)  %


 


MCV    ()  fL


 


MCH    (26-32)  pg


 


MCHC    (32-36)  g/dL


 


RDW    (11.5-14.0)  %


 


Plt Count    (150-450)  x10^3/uL


 


MPV    (7.5-11.0)  fL


 


Gran %    (36.0-66.0)  %


 


Immature Gran % (Auto)    (0.00-0.4)  %


 


Nucleat RBC Rel Count    (0.00-0.1)  %


 


Eos # (Auto)    (0-0.5)  x10^3/uL


 


Immature Gran # (Auto)    (0.00-0.03)  x10^3u/L


 


Absolute Lymphs (auto)    (1.0-4.6)  x10^3/uL


 


Absolute Monos (auto)    (0.0-1.3)  x10^3/uL


 


Absolute Nucleated RBC    (0.00-0.01)  x10^3u/L


 


Lymphocytes %    (24.0-44.0)  %


 


Monocytes %    (0.0-12.0)  %


 


Eosinophils %    (0.00-5.0)  %


 


Basophils %    (0.0-0.4)  %


 


Absolute Granulocytes    (1.4-6.9)  x10^3/uL


 


Basophils #    (0-0.4)  x10^3/uL


 


Sodium  135 L   (137-145)  mmol/L


 


Potassium  3.5   (3.5-5.1)  mmol/L


 


Chloride  100   ()  mmol/L


 


Carbon Dioxide  28   (22-30)  mmol/L


 


Anion Gap  11.5   (5-15)  MEQ/L


 


BUN  21 H   (9-20)  mg/dL


 


Creatinine  1.00   (0.66-1.25)  mg/dL


 


Estimated GFR  > 60.0   ML/MIN


 


Glucose  116 H   ()  mg/dL


 


Lactic Acid   0.7  (0.4-2.0)  


 


Calcium  8.7   (8.4-10.2)  mg/dL


 


Total Bilirubin  0.50   (0.2-1.3)  mg/dL


 


AST  21   (17-59)  U/L


 


ALT  12   (0-50)  U/L


 


Alkaline Phosphatase  57   ()  U/L


 


Troponin I    (0.000-0.034)  ng/mL


 


Serum Total Protein  6.1 L   (6.3-8.2)  g/dL


 


Albumin  3.3 L   (3.5-5.0)  g/dL


 


Urinalys Dipstick Clnc    


 


Urine Color    (YELLOW)  


 


Urine Appearance    (CLEAR)  


 


Urine pH    (5-6)  


 


Ur Specific Gravity    (1.005-1.025)  


 


POC Urine Protein Conf    (Negative)  


 


Urine Ketones    (NEGATIVE)  


 


Urine Nitrite    (NEGATIVE)  


 


Urine Bilirubin    (NEGATIVE)  


 


Urine Urobilinogen    (0-1)  mg/dL


 


Urine Leukocytes    (NEGATIVE)  


 


Urine WBC (Auto)    (0-5)  /HPF


 


Urine RBC (Auto)    (0-2)  /HPF


 


U Epithel Cells (Auto)    (FEW)  /HPF


 


Urine Bacteria (Auto)    (NEGATIVE)  /HPF


 


Urine RBC    (0-5)  Dereje/ul


 


Ur Culture Indicated?    


 


Urine Glucose    (NEGATIVE)  mg/dL


 


Urine Opiates Level    (NEGATIVE)  


 


Ur Methadone    (NEGATIVE)  


 


Urine Barbiturates    (NEGATIVE)  


 


Ur Phencyclidine (PCP)    (NEGATIVE)  


 


Urine Amphetamine    (NEGATIVE)  


 


U Benzodiazepine Level    (NEGATIVE)  


 


Urine Cocaine    (NEGATIVE)  


 


Urine Marijuana (THC)    (NEGATIVE)  


 


Ethyl Alcohol    (0-10)  mg/dL


 


Influenza Type A Ag    (NEGATIVE)  


 


Influenza Type B Ag    (NEGATIVE)  


 


RSV (PCR)    (Negative)  


 


SARS-CoV-2 (PCR)    (NEGATIVE)  


 


Slides for Path Review    














- Radiology Impressions


Radiology Exams & Impressions: 


                              Radiology Procedures











 Category Date Time Status


 


 CHEST WITHOUT CONTRAST [CT] Stat Exams  06/16/22 17:39 Taken


 


 HEAD WITHOUT CONTRAST [CT] Stat Exams  06/16/22 17:39 Taken














- Other Procedures and Tests


                               Respiratory Therapy





06/16/22 19:33


Oxygen Nasal Cannula 3 lpm 





06/16/22 22:39


Respiratory Therapy Assessment DAILY 














Assessment/Plan


(1) Pneumonia


Current Visit: Yes   Status: Acute   


Assessment & Plan: 


continue rocephin/zithromax, will need to attempt to wean from oxygen prior to 

discharge. likely has some element of underlying copd but no wheezing on exam


Code(s): J18.9 - PNEUMONIA, UNSPECIFIED ORGANISM

## 2022-06-17 NOTE — XRAY
Indication: Acute mental status change.  Weakness.



Multiple contiguous images obtained through the head without contrast.



Comparison: None



Again age-appropriate global atrophy and benign physiologic basal ganglia

calcifications.  No acute intracranial hemorrhage, abnormal extra-axial fluid

collection, or mass effect.  Fourth ventricle is midline without

hydrocephalus.  Gray-white matter differentiation is preserved.  Bony

calvarium intact.  1.9 cm right maxillary sinus polyp/retention cyst.

Remaining visualized paranasal sinuses and mastoid air cells are clear.



Impression: Right maxillary sinus polyp/retention cyst.  Remaining CT head

without contrast exam is again negative.

## 2022-06-17 NOTE — XRAY
Indication: Cough.  Short of breath.



Multiple contiguous axial images obtained through the chest without contrast.



Comparison: None



Right lower lobe demonstrates moderate diffuse interstitial alveolar

opacities.  Similar minimal opacity seen in the posterior left lower lobe.  No

effusion.  Remaining lungs demonstrates moderate diffuse pulmonary emphysema

with bilateral scattered fibrosis/scarring.  Heart is not enlarged.  Aorta

mildly arteriosclerotic without aneurysm.  No pathologic mediastinal

lymphadenopathy.  Small hiatal hernia.



Bony thorax intact with minimal degenerative changes throughout the spine.



Limited upper abdomen demonstrates moderate colonic fecal debris, 1.2 cm right

lobe hepatic cyst, and 2.5 cm left mid renal exophytic cyst



Impression:

1.  Right lower lobe and lesser degree left lower lobe pneumonia without

effusion.

2.  Incidental small hiatal hernia and fecal stasis

3.  Chronic findings including emphysema, pulmonary fibrosis/scarring, hepatic

cyst, and left renal cyst.

## 2022-06-18 VITALS — DIASTOLIC BLOOD PRESSURE: 57 MMHG | HEART RATE: 60 BPM | OXYGEN SATURATION: 91 % | SYSTOLIC BLOOD PRESSURE: 122 MMHG

## 2022-06-18 LAB
ANION GAP SERPL CALC-SCNC: 8.6 MEQ/L (ref 5–15)
BASOPHILS # BLD AUTO: 0.04 X10^3/UL (ref 0–0.4)
BASOPHILS # BLD AUTO: 0.06 X10^3/UL (ref 0–0.4)
BUN SERPL-MCNC: 14 MG/DL (ref 9–20)
CALCIUM SPEC-MCNC: 8.6 MG/DL (ref 8.4–10.2)
CHLORIDE SERPL-SCNC: 103 MMOL/L (ref 98–107)
CO2 SERPL-SCNC: 29 MMOL/L (ref 22–30)
CREAT SERPL-MCNC: 0.93 MG/DL (ref 0.66–1.25)
EOSINOPHIL # BLD AUTO: 0.19 X10^3/UL (ref 0–0.5)
EOSINOPHIL # BLD AUTO: 0.24 X10^3/UL (ref 0–0.5)
GFR SERPLBLD BASED ON 1.73 SQ M-ARVRAT: > 60 ML/MIN
GLUCOSE SERPL-MCNC: 108 MG/DL (ref 74–106)
HCT VFR BLD AUTO: 33.6 % (ref 42–50)
HCT VFR BLD AUTO: 37 % (ref 42–50)
HGB BLD-MCNC: 11.5 G/DL (ref 12.5–18)
HGB BLD-MCNC: 12.2 G/DL (ref 12.5–18)
LYMPHOCYTES # SPEC AUTO: 1.67 X10^3/UL (ref 1–4.6)
LYMPHOCYTES # SPEC AUTO: 1.8 X10^3/UL (ref 1–4.6)
MCH RBC QN AUTO: 29.8 PG (ref 26–32)
MCH RBC QN AUTO: 30.2 PG (ref 26–32)
MCHC RBC AUTO-ENTMCNC: 33 G/DL (ref 32–36)
MCHC RBC AUTO-ENTMCNC: 34.2 G/DL (ref 32–36)
MONOCYTES # BLD AUTO: 1.46 X10^3/UL (ref 0–1.3)
MONOCYTES # BLD AUTO: 1.49 X10^3/UL (ref 0–1.3)
PLATELET # BLD AUTO: 218 X10^3/UL (ref 150–450)
PLATELET # BLD AUTO: 231 X10^3/UL (ref 150–450)
POTASSIUM SERPLBLD-SCNC: 3.7 MMOL/L (ref 3.5–5.1)
RBC # BLD AUTO: 3.81 X10^6/UL (ref 4.1–5.6)
RBC # BLD AUTO: 4.1 X10^6/UL (ref 4.1–5.6)
SODIUM SERPL-SCNC: 137 MMOL/L (ref 137–145)
WBC # BLD AUTO: 10.9 X10^3/UL (ref 4–10.5)
WBC # BLD AUTO: 11 X10^3/UL (ref 4–10.5)

## 2022-06-18 RX ADMIN — LATANOPROST SCH ML: 50 SOLUTION OPHTHALMIC at 09:10

## 2022-06-18 RX ADMIN — PANTOPRAZOLE SODIUM SCH MG: 40 INJECTION, POWDER, FOR SOLUTION INTRAVENOUS at 09:10

## 2022-06-18 RX ADMIN — ENOXAPARIN SODIUM SCH MG: 100 INJECTION SUBCUTANEOUS at 09:10

## 2022-06-18 NOTE — PCM.DCORD
- Discharge


Disposition: Home, Self-Care


Condition: Stable


Prescriptions: 


New


   Cefdinir 300 mg PO BID 7 Days #14


   Azithromycin [Zithromax Tri-Pardeep] 500 mg PO DAILY 3 Days #3 tablet


   Albuterol/Ipratropium 3ml Neb* [DUONEB 0.5-3 MG/3 ml Neb**] 3 ml IH Q4HPRN 

PRN


     PRN Reason: Shortness Of Breath/Wheezing





Continue


   Aspirin 81 gm Chew*** [Baby Aspirin 81 mg Chew***] 81 mg PO QHS


   Oxybutynin Chloride [Oxybutynin Chloride ER] 10 mg PO QHS


   Meloxicam 7.5 tab PO QHS


   Multivitamin [Multivitamins] 1 tab PO QHS


   Chlorthalidone 12.5 mg PO QHS


   Latanoprost/Pf [Latanoprost 0.005% Eye Drop] 1 drop OP DAILY


   Pravastatin Sodium 20 mg PO QHS


   Timolol Maleate/Pf [Timolol Maleate 0.5% Eye Drop] 1 drop OP QHS


Additional Instructions: 


Nebulizer treatments at home TID until seen by Dr Saleh(states has machine and 

Rx)


Follow up with: 


MARCUS SALEH MD [Primary Care Provider] - 06/27/22 1:30 pm

## 2025-01-31 ENCOUNTER — HOSPITAL ENCOUNTER (OUTPATIENT)
Dept: HOSPITAL 33 - SDC | Age: 75
Discharge: HOME | End: 2025-01-31
Attending: OPHTHALMOLOGY
Payer: MEDICARE

## 2025-01-31 VITALS — SYSTOLIC BLOOD PRESSURE: 138 MMHG | HEART RATE: 56 BPM | OXYGEN SATURATION: 99 % | DIASTOLIC BLOOD PRESSURE: 68 MMHG

## 2025-01-31 VITALS — TEMPERATURE: 97.7 F | RESPIRATION RATE: 16 BRPM

## 2025-01-31 DIAGNOSIS — H25.811: Primary | ICD-10-CM

## 2025-01-31 PROCEDURE — C1780 LENS, INTRAOCULAR (NEW TECH): HCPCS

## 2025-01-31 RX ADMIN — TETRACAINE HYDROCHLORIDE ONE ML: 5 SOLUTION OPHTHALMIC at 08:54

## 2025-01-31 RX ADMIN — ACETAZOLAMIDE ONE MG: 250 TABLET ORAL at 13:07

## 2025-01-31 RX ADMIN — PHENYLEPHRINE HYDROCHLORIDE ONE ML: 2.5 SOLUTION/ DROPS OPHTHALMIC at 08:54

## 2025-01-31 RX ADMIN — TETRACAINE HYDROCHLORIDE ONE ML: 5 SOLUTION OPHTHALMIC at 09:22

## 2025-03-18 ENCOUNTER — HOSPITAL ENCOUNTER (OUTPATIENT)
Dept: HOSPITAL 33 - SDC | Age: 75
Discharge: HOME | End: 2025-03-18
Attending: OPHTHALMOLOGY
Payer: MEDICARE

## 2025-03-18 VITALS — HEART RATE: 58 BPM | DIASTOLIC BLOOD PRESSURE: 56 MMHG | TEMPERATURE: 97.7 F | SYSTOLIC BLOOD PRESSURE: 99 MMHG

## 2025-03-18 VITALS — OXYGEN SATURATION: 95 % | RESPIRATION RATE: 16 BRPM

## 2025-03-18 DIAGNOSIS — H25.812: Primary | ICD-10-CM

## 2025-03-18 PROCEDURE — C1780 LENS, INTRAOCULAR (NEW TECH): HCPCS

## 2025-03-18 PROCEDURE — 93005 ELECTROCARDIOGRAM TRACING: CPT

## 2025-03-18 PROCEDURE — 68841 INSJ RX ELUT IMPLT LAC CANAL: CPT

## 2025-03-18 PROCEDURE — 99100 ANES PT EXTEME AGE<1 YR&>70: CPT

## 2025-03-18 RX ADMIN — ACETAZOLAMIDE ONE MG: 250 TABLET ORAL at 10:02

## 2025-03-18 RX ADMIN — TETRACAINE HYDROCHLORIDE ONE ML: 5 SOLUTION OPHTHALMIC at 07:38
